# Patient Record
Sex: FEMALE | Race: WHITE | NOT HISPANIC OR LATINO | URBAN - METROPOLITAN AREA
[De-identification: names, ages, dates, MRNs, and addresses within clinical notes are randomized per-mention and may not be internally consistent; named-entity substitution may affect disease eponyms.]

---

## 2021-01-12 ENCOUNTER — OFFICE (OUTPATIENT)
Dept: URBAN - METROPOLITAN AREA CLINIC 106 | Facility: CLINIC | Age: 81
End: 2021-01-12

## 2021-01-12 VITALS
SYSTOLIC BLOOD PRESSURE: 110 MMHG | TEMPERATURE: 97.2 F | WEIGHT: 147 LBS | HEART RATE: 88 BPM | HEIGHT: 62 IN | DIASTOLIC BLOOD PRESSURE: 58 MMHG

## 2021-01-12 DIAGNOSIS — Z88.2 ALLERGY STATUS TO SULFONAMIDES: ICD-10-CM

## 2021-01-12 DIAGNOSIS — K51.019 ULCERATIVE (CHRONIC) PANCOLITIS WITH UNSPECIFIED COMPLICATIO: ICD-10-CM

## 2021-01-12 DIAGNOSIS — E86.0 DEHYDRATION: ICD-10-CM

## 2021-01-12 PROCEDURE — 99214 OFFICE O/P EST MOD 30 MIN: CPT | Performed by: SPECIALIST

## 2021-01-12 NOTE — SERVICEHPINOTES
America Linares   is seen today for a follow-up visit.     Patient has history of UC pancolitis and allergy to sulfa.  She now has too numerous to count BM with blood and cramps with fecal incontinence.  Her supportive daughter accompanies her today.  She generally does not feel well.  Stools for c diff and calprotectin were ordered and sent but for some reason not received by [facility].  Pt taking bile binding agents without improvementPrevious diagnostic tests have included have included CT scan and colonoscopy (incomplete 12/13 surgery 3/13 Dr. 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 Quiroz did colon and dx with pan ulcerative colitis and started prednisone and mesalamine but she developed pruritic rash and stopped both medications.) Able to go back on Prednisone 2/19 TB cellular testing negative as was Hepatitis B testing She was seen 6/2019 by Dr. Mares and we discussed all treatment options.  Entyvio was going to cost $7000. She was approved for patient assistance for Uceris which she started 9/2019 and she was improved and stopped. She was referred to [facility] [facility] but they do not take her insurance. Surgical history: cholecystectomy (and partial sigmoid colon for stricture).BR.  Taking bile binding agents without relief or problenmBR

## 2021-01-13 LAB
CBC WITH DIFFERENTIAL/PLATELET: BASO (ABSOLUTE): 0 X10E3/UL (ref 0–0.2)
CBC WITH DIFFERENTIAL/PLATELET: BASOS: 0 %
CBC WITH DIFFERENTIAL/PLATELET: EOS (ABSOLUTE): 0.1 X10E3/UL (ref 0–0.4)
CBC WITH DIFFERENTIAL/PLATELET: EOS: 1 %
CBC WITH DIFFERENTIAL/PLATELET: HEMATOCRIT: 35 % (ref 34–46.6)
CBC WITH DIFFERENTIAL/PLATELET: HEMATOLOGY COMMENTS: (no result)
CBC WITH DIFFERENTIAL/PLATELET: HEMOGLOBIN: 11.1 G/DL (ref 11.1–15.9)
CBC WITH DIFFERENTIAL/PLATELET: IMMATURE CELLS: (no result)
CBC WITH DIFFERENTIAL/PLATELET: IMMATURE GRANS (ABS): (no result)
CBC WITH DIFFERENTIAL/PLATELET: IMMATURE GRANULOCYTES: (no result)
CBC WITH DIFFERENTIAL/PLATELET: LYMPHS (ABSOLUTE): 0.6 X10E3/UL — LOW (ref 0.7–3.1)
CBC WITH DIFFERENTIAL/PLATELET: LYMPHS: 7 %
CBC WITH DIFFERENTIAL/PLATELET: MCH: 25.9 PG — LOW (ref 26.6–33)
CBC WITH DIFFERENTIAL/PLATELET: MCHC: 31.7 G/DL (ref 31.5–35.7)
CBC WITH DIFFERENTIAL/PLATELET: MCV: 82 FL (ref 79–97)
CBC WITH DIFFERENTIAL/PLATELET: MONOCYTES(ABSOLUTE): 3.1 X10E3/UL — HIGH (ref 0.1–0.9)
CBC WITH DIFFERENTIAL/PLATELET: MONOCYTES: 34 %
CBC WITH DIFFERENTIAL/PLATELET: NEUTROPHILS (ABSOLUTE): 5.3 X10E3/UL (ref 1.4–7)
CBC WITH DIFFERENTIAL/PLATELET: NEUTROPHILS: 58 %
CBC WITH DIFFERENTIAL/PLATELET: NRBC: (no result)
CBC WITH DIFFERENTIAL/PLATELET: PLATELETS: 573 X10E3/UL — HIGH (ref 150–450)
CBC WITH DIFFERENTIAL/PLATELET: RBC: 4.28 X10E6/UL (ref 3.77–5.28)
CBC WITH DIFFERENTIAL/PLATELET: RDW: 12.3 % (ref 11.7–15.4)
CBC WITH DIFFERENTIAL/PLATELET: WBC: 9.2 X10E3/UL (ref 3.4–10.8)
COMP. METABOLIC PANEL (14): A/G RATIO: 1 — LOW (ref 1.2–2.2)
COMP. METABOLIC PANEL (14): ALBUMIN: 2.8 G/DL — LOW (ref 3.7–4.7)
COMP. METABOLIC PANEL (14): ALKALINE PHOSPHATASE: 69 IU/L (ref 39–117)
COMP. METABOLIC PANEL (14): ALT (SGPT): 8 IU/L (ref 0–32)
COMP. METABOLIC PANEL (14): AST (SGOT): 12 IU/L (ref 0–40)
COMP. METABOLIC PANEL (14): BILIRUBIN, TOTAL: 0.7 MG/DL (ref 0–1.2)
COMP. METABOLIC PANEL (14): BUN/CREATININE RATIO: 15 (ref 12–28)
COMP. METABOLIC PANEL (14): BUN: 22 MG/DL (ref 8–27)
COMP. METABOLIC PANEL (14): CALCIUM: 8.5 MG/DL — LOW (ref 8.7–10.3)
COMP. METABOLIC PANEL (14): CARBON DIOXIDE, TOTAL: 20 MMOL/L (ref 20–29)
COMP. METABOLIC PANEL (14): CHLORIDE: 94 MMOL/L — LOW (ref 96–106)
COMP. METABOLIC PANEL (14): CREATININE: 1.45 MG/DL — HIGH (ref 0.57–1)
COMP. METABOLIC PANEL (14): EGFR IF AFRICN AM: 40 ML/MIN/1.73 — LOW (ref 59–?)
COMP. METABOLIC PANEL (14): EGFR IF NONAFRICN AM: 35 ML/MIN/1.73 — LOW (ref 59–?)
COMP. METABOLIC PANEL (14): GLOBULIN, TOTAL: 2.7 G/DL (ref 1.5–4.5)
COMP. METABOLIC PANEL (14): GLUCOSE: 112 MG/DL — HIGH (ref 65–99)
COMP. METABOLIC PANEL (14): POTASSIUM: 3.4 MMOL/L — LOW (ref 3.5–5.2)
COMP. METABOLIC PANEL (14): PROTEIN, TOTAL: 5.5 G/DL — LOW (ref 6–8.5)
COMP. METABOLIC PANEL (14): SODIUM: 132 MMOL/L — LOW (ref 134–144)

## 2021-01-19 ENCOUNTER — INPATIENT HOSPITAL (OUTPATIENT)
Dept: URBAN - METROPOLITAN AREA MEDICAL CENTER 9 | Facility: MEDICAL CENTER | Age: 81
End: 2021-01-19
Payer: MEDICARE

## 2021-01-19 DIAGNOSIS — R19.7 DIARRHEA, UNSPECIFIED: ICD-10-CM

## 2021-01-19 DIAGNOSIS — D50.9 IRON DEFICIENCY ANEMIA, UNSPECIFIED: ICD-10-CM

## 2021-01-19 DIAGNOSIS — R93.5 ABNORMAL FINDINGS ON DIAGNOSTIC IMAGING OF OTHER ABDOMINAL R: ICD-10-CM

## 2021-01-19 DIAGNOSIS — K51.019 ULCERATIVE (CHRONIC) PANCOLITIS WITH UNSPECIFIED COMPLICATIO: ICD-10-CM

## 2021-01-19 PROCEDURE — 99232 SBSQ HOSP IP/OBS MODERATE 35: CPT | Performed by: INTERNAL MEDICINE

## 2021-01-20 ENCOUNTER — INPATIENT HOSPITAL (OUTPATIENT)
Dept: URBAN - METROPOLITAN AREA MEDICAL CENTER 9 | Facility: MEDICAL CENTER | Age: 81
End: 2021-01-20
Payer: MEDICARE

## 2021-01-20 DIAGNOSIS — K51.019 ULCERATIVE (CHRONIC) PANCOLITIS WITH UNSPECIFIED COMPLICATIO: ICD-10-CM

## 2021-01-20 PROCEDURE — 99232 SBSQ HOSP IP/OBS MODERATE 35: CPT | Performed by: SPECIALIST

## 2021-02-02 ENCOUNTER — OFFICE (OUTPATIENT)
Dept: URBAN - METROPOLITAN AREA CLINIC 106 | Facility: CLINIC | Age: 81
End: 2021-02-02
Payer: MEDICARE

## 2021-02-02 VITALS
SYSTOLIC BLOOD PRESSURE: 120 MMHG | WEIGHT: 136 LBS | HEART RATE: 82 BPM | DIASTOLIC BLOOD PRESSURE: 78 MMHG | HEIGHT: 62 IN | TEMPERATURE: 97.1 F

## 2021-02-02 DIAGNOSIS — E05.00 THYROTOXICOSIS WITH DIFFUSE GOITER WITHOUT THYROTOXIC CRISIS: ICD-10-CM

## 2021-02-02 DIAGNOSIS — B37.0 CANDIDAL STOMATITIS: ICD-10-CM

## 2021-02-02 DIAGNOSIS — K51.019 ULCERATIVE (CHRONIC) PANCOLITIS WITH UNSPECIFIED COMPLICATIO: ICD-10-CM

## 2021-02-02 PROCEDURE — 99214 OFFICE O/P EST MOD 30 MIN: CPT | Performed by: SPECIALIST

## 2021-02-02 RX ORDER — FLUCONAZOLE 100 MG/1
TABLET ORAL
Qty: 16 | Refills: 0 | Status: ACTIVE
Start: 2021-02-02

## 2021-02-02 NOTE — SERVICEHPINOTES
America Linares   is seen today for a follow-up visit after recent hosspitilization.     Patient has history of UC pancolitis and allergy to sulfa. She was having too numerous to count BM with blood and cramps with fecal incontinence. Her supportive daughter accompanies her today. She generally does not feel well. Stools for c diff and calprotectin were ordered and sent but for some reason not received by [facility]. Pt taking bile binding agents without improvementPrevious diagnostic tests have included have included CT scan and colonoscopy (incomplete 12/13 surgery 3/13 Dr. 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 Quiroz did colon and dx with pan ulcerative colitis and started prednisone and mesalamine but she developed pruritic rash and stopped both medications.) Able to go back on Prednisone 2/19 TB cellular testing negative as was Hepatitis B testing She was seen 6/2019 by Dr. Mares and we discussed all treatment options. Entyvio was going to cost $7000. She was approved for patient assistance for Uceris which she started 9/2019 and she was improved and stopped. She was referred to [place] but they do not take her insurance. Surgical history: cholecystectomy (and partial sigmoid colon for stricture).Last visit her daughter was with her.  Today her daughter inn law accompanies her.  Her bleeding has resolved and her diarrhea is less.  She has oral thrush and anorexia and weight lossScreening NIEVES was negative for lupus....she does have Grave's disease on no therapy and needs to f/u with her endocrinologist.  Less diarrhea....mainly nocturnal BM without bleeding since IV steroids and oral steroids.  C diff was negativeBR.BR

## 2021-02-02 NOTE — SERVICENOTES
Lengthy discussion with patient again today (daughter in past and daugther-in-law today) about immunosuppressants (6MP, six mercaptopurine, Imuran, methotrexate), biologics (Humira, Remicade, Simponi, Cimzia, Entyvio, and Stelara), and Xeljanz. Risks including infection, abnormal liver functions test, liver toxicity (including activation of hepatitis B), pancreatitis, bone marrow suppression (anemia, decreased WBC, decreased platelets), worsening of heart failure, TB (tuberculosis) activation, and rare lymphomas which can be fatal were all discussed, handouts were given, and need for close monitoring of labs was stressed. Potential risks (osteoporosis, avascular necrosis, weight gain, elevation of glucose among many) of steroids (prednisone and budesonide). First pass hepatic metabolism of Entocort was also discussed.
Handouts were either given to the patient today or in the past. The patient was encouraged to ask questions as well as encouraged to look at the [facility] website or call if further information is needed. It was also recommended that the patient consult with their pharmacist and PCP if any of the above medications are used. The patient offered no further questions.

Pt has negative hep B and TB status. Also counseled about need for skin exams and watch for signs of heart failure. Also encouraged to remain up to date with vaccines via PCP but avoid live vaccinations.

We also discussed total colectomy if toxic megacolon, dysplasia or if she fails medical therapy.

They appear to understand and want to proceed with REMICADE if does not respond soon.  Also want thyroid checked to see if  Grave's in contributing to her issues

## 2021-02-22 ENCOUNTER — OFFICE (OUTPATIENT)
Dept: URBAN - METROPOLITAN AREA CLINIC 106 | Facility: CLINIC | Age: 81
End: 2021-02-22
Payer: MEDICARE

## 2021-02-22 VITALS
HEIGHT: 62 IN | SYSTOLIC BLOOD PRESSURE: 118 MMHG | HEART RATE: 78 BPM | WEIGHT: 126 LBS | TEMPERATURE: 97 F | DIASTOLIC BLOOD PRESSURE: 69 MMHG | RESPIRATION RATE: 14 BRPM

## 2021-02-22 DIAGNOSIS — R19.7 DIARRHEA, UNSPECIFIED: ICD-10-CM

## 2021-02-22 DIAGNOSIS — R10.9 UNSPECIFIED ABDOMINAL PAIN: ICD-10-CM

## 2021-02-22 DIAGNOSIS — K51.019 ULCERATIVE (CHRONIC) PANCOLITIS WITH UNSPECIFIED COMPLICATIO: ICD-10-CM

## 2021-02-22 DIAGNOSIS — E05.00 THYROTOXICOSIS WITH DIFFUSE GOITER WITHOUT THYROTOXIC CRISIS: ICD-10-CM

## 2021-02-22 PROCEDURE — 99214 OFFICE O/P EST MOD 30 MIN: CPT

## 2021-03-29 ENCOUNTER — INPATIENT HOSPITAL (OUTPATIENT)
Dept: URBAN - METROPOLITAN AREA MEDICAL CENTER 9 | Facility: MEDICAL CENTER | Age: 81
End: 2021-03-29
Payer: MEDICARE

## 2021-03-29 DIAGNOSIS — R10.30 LOWER ABDOMINAL PAIN, UNSPECIFIED: ICD-10-CM

## 2021-03-29 DIAGNOSIS — D72.829 ELEVATED WHITE BLOOD CELL COUNT, UNSPECIFIED: ICD-10-CM

## 2021-03-29 DIAGNOSIS — K51.919 ULCERATIVE COLITIS, UNSPECIFIED WITH UNSPECIFIED COMPLICATIO: ICD-10-CM

## 2021-03-29 DIAGNOSIS — R19.7 DIARRHEA, UNSPECIFIED: ICD-10-CM

## 2021-03-29 PROCEDURE — 99232 SBSQ HOSP IP/OBS MODERATE 35: CPT | Performed by: INTERNAL MEDICINE

## 2021-03-31 ENCOUNTER — INPATIENT HOSPITAL (OUTPATIENT)
Dept: URBAN - METROPOLITAN AREA MEDICAL CENTER 9 | Facility: MEDICAL CENTER | Age: 81
End: 2021-03-31
Payer: MEDICARE

## 2021-03-31 VITALS — HEIGHT: 62 IN

## 2021-03-31 DIAGNOSIS — K51.90 ULCERATIVE COLITIS, UNSPECIFIED, WITHOUT COMPLICATIONS: ICD-10-CM

## 2021-03-31 PROCEDURE — 99232 SBSQ HOSP IP/OBS MODERATE 35: CPT | Performed by: SPECIALIST

## 2021-04-01 ENCOUNTER — INPATIENT HOSPITAL (OUTPATIENT)
Dept: URBAN - METROPOLITAN AREA MEDICAL CENTER 9 | Facility: MEDICAL CENTER | Age: 81
End: 2021-04-01
Payer: MEDICARE

## 2021-04-01 VITALS — HEIGHT: 62 IN

## 2021-04-01 DIAGNOSIS — K51.80 OTHER ULCERATIVE COLITIS WITHOUT COMPLICATIONS: ICD-10-CM

## 2021-04-01 DIAGNOSIS — K92.1 MELENA: ICD-10-CM

## 2021-04-01 PROCEDURE — 99232 SBSQ HOSP IP/OBS MODERATE 35: CPT | Performed by: SPECIALIST

## 2021-04-16 ENCOUNTER — OFFICE (OUTPATIENT)
Dept: URBAN - METROPOLITAN AREA CLINIC 106 | Facility: CLINIC | Age: 81
End: 2021-04-16
Payer: MEDICARE

## 2021-04-16 VITALS — HEIGHT: 62 IN

## 2021-04-16 DIAGNOSIS — K51.019 ULCERATIVE (CHRONIC) PANCOLITIS WITH UNSPECIFIED COMPLICATIO: ICD-10-CM

## 2021-04-16 DIAGNOSIS — Z23 ENCOUNTER FOR IMMUNIZATION: ICD-10-CM

## 2021-04-16 PROCEDURE — 90471 IMMUNIZATION ADMIN: CPT | Performed by: SPECIALIST

## 2021-11-16 ENCOUNTER — OFFICE (OUTPATIENT)
Dept: URBAN - METROPOLITAN AREA CLINIC 67 | Facility: CLINIC | Age: 81
End: 2021-11-16

## 2021-11-16 VITALS
WEIGHT: 144 LBS | HEART RATE: 81 BPM | OXYGEN SATURATION: 95 % | SYSTOLIC BLOOD PRESSURE: 124 MMHG | TEMPERATURE: 98.1 F | DIASTOLIC BLOOD PRESSURE: 80 MMHG | HEIGHT: 62 IN

## 2021-11-16 DIAGNOSIS — K51.00 ULCERATIVE (CHRONIC) PANCOLITIS WITHOUT COMPLICATIONS: ICD-10-CM

## 2021-11-16 DIAGNOSIS — Z88.2 ALLERGY STATUS TO SULFONAMIDES: ICD-10-CM

## 2021-11-16 PROCEDURE — 99214 OFFICE O/P EST MOD 30 MIN: CPT | Performed by: SPECIALIST

## 2021-11-16 NOTE — SERVICENOTES
Lengthy discussion with pt about immunosuppressants (6MP, six mercaptopurine, Imuran, methotrexate), biologics (Humira, Remicade, Simponi, Cimzia, Entyvio, and Stelara), and Xeljanz. Risks including infection, abnormal liver functions test, liver toxicity (including activation of hepatitis B), pancreatitis, bone marrow suppression (anemia, decreased WBC, decreased platelets), worsening of heart failure, TB (tuberculosis) activation, and rare lymphomas which can be fatal were all discussed, handouts were given, and need for close monitoring of labs was stressed. Potential risks (osteoporosis, avascular necrosis, weight gain, elevation of glucose among many) of steroids (prednisone and budesonide). First pass hepatic metabolism of Entocort was also discussed.
Handouts were either given to the patient today or in the past. The patient was encouraged to ask questions as well as encouraged to look at the [facility] website or call if further information is needed. It was also recommended that the patient consult with their pharmacist and PCP if any of the above medications are used. The patient offered no further questions.

Pt encouraged to discuss hep B and TB status with prescribing MD. Also counseled about need for skin exams and watch for signs of heart failure. Also encouraged to remain up to date with vaccines via PCP but avoid live vaccinations

## 2021-11-16 NOTE — SERVICEHPINOTES
America Linares   is seen today for a follow-up visit.     
br
brCurrent symptoms include: none at presentDate of diagnosis/Presenting symptoms: 2/19 Dr HoodLocation/extent of disease: pan colitisbrComplications/Extra intestinal:Other significant history:brSmoking status negTB/Hep B status: negbrimmunizations:brPrior medications/reactions/drug levels:  Rash to 5ASAbrCurrent medications: Humira q 2 weeksbrNutrition/supplements:Studiesbrlabs:brCBC brCMP bresr/crp/fecal [id number] brIron studies brvit D brotherimaging: CTpbrSurgeries: sigmoid resection for diverticulitis/Stricture Dr Stuart 3//13brEGD:brColonoscopy: 12/13 sigmoid stricturebrSmall bowel studies:br
br
br Iron infusion  HemphillMedical record review:I have reviewed the above studies/records and incorporated them into my medical decision making when applicable.br Constipation is described as the following:brReason for encounter: reoccurrence of symptoms. The frequency of bowel movements has been per day (2-3). The stools are hard stools. 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
br
br Ulcerative colitis is described as the following:brReason for encounter: for routine follow-up. The disease is described as being located in the entire colon. Surgical history: cholecystectomy (and partial sigmoid colon for stricture).Current medications include Lialda (developed rash and stopped has sulfa allergy) and Prednisone (in past).Current medications include mesalamine (rash) and Uceris.Previous evaluations have included CT scan, colonoscopy (2/2019- DR Quiroz- moderate to severe ulcerative pancolitis,path- severe colitis, no dysplasia), Hgb/Hct/WBC/MCV/platelet count (2/19- WV=BC 6, Hgb 9, Hct 30, plat 393,  6/2019- WBC 6, Hgb 12, Hct 39, plat 313), Basic metabolic panel (BMP) (12/4/19- Glucose 83, Creat 1.05, BUN 27, GFR 51) and liver function tests (T.B./alk.phos./AST/ALT) (2/19- normal).

## 2022-03-31 ENCOUNTER — RX ONLY (RX ONLY)
Age: 82
End: 2022-03-31

## 2022-03-31 ENCOUNTER — CONSULT (OUTPATIENT)
Dept: URBAN - METROPOLITAN AREA CLINIC 18 | Facility: CLINIC | Age: 82
Setting detail: DERMATOLOGY
End: 2022-03-31

## 2022-03-31 DIAGNOSIS — L57.0 ACTINIC KERATOSIS: ICD-10-CM

## 2022-03-31 PROBLEM — L85.3 XEROSIS CUTIS: Status: RESOLVED | Noted: 2022-03-31

## 2022-03-31 PROBLEM — L20.9 ATOPIC DERMATITIS, UNSPECIFIED: Status: RESOLVED | Noted: 2022-03-31

## 2022-03-31 PROCEDURE — 99204 OFFICE O/P NEW MOD 45 MIN: CPT

## 2022-03-31 PROCEDURE — 17110 DESTRUCT B9 LESION 1-14: CPT

## 2022-03-31 RX ORDER — TRIAMCINOLONE ACETONIDE 1 MG/G
CREAM TOPICAL
Qty: 454 | Refills: 3
Start: 2022-03-31

## 2022-06-14 ENCOUNTER — APPOINTMENT (OUTPATIENT)
Dept: URBAN - METROPOLITAN AREA SURGERY 11 | Age: 82
Setting detail: DERMATOLOGY
End: 2022-06-14

## 2022-06-14 DIAGNOSIS — L44.8 OTHER SPECIFIED PAPULOSQUAMOUS DISORDERS: ICD-10-CM

## 2022-06-14 DIAGNOSIS — L20.89 OTHER ATOPIC DERMATITIS: ICD-10-CM

## 2022-06-14 DIAGNOSIS — D69.2 OTHER NONTHROMBOCYTOPENIC PURPURA: ICD-10-CM

## 2022-06-14 PROCEDURE — OTHER PRESCRIPTION: OTHER

## 2022-06-14 PROCEDURE — OTHER COUNSELING: OTHER

## 2022-06-14 PROCEDURE — 99214 OFFICE O/P EST MOD 30 MIN: CPT

## 2022-06-14 PROCEDURE — OTHER REASSURANCE: OTHER

## 2022-06-14 RX ORDER — TACROLIMUS 1 MG/G
OINTMENT TOPICAL
Qty: 60 | Refills: 6 | Status: ERX | COMMUNITY
Start: 2022-06-14

## 2022-06-14 ASSESSMENT — LOCATION SIMPLE DESCRIPTION DERM
LOCATION SIMPLE: LEFT FOREARM
LOCATION SIMPLE: RIGHT FOREARM
LOCATION SIMPLE: RIGHT PRETIBIAL REGION
LOCATION SIMPLE: LEFT PRETIBIAL REGION
LOCATION SIMPLE: RIGHT CALF
LOCATION SIMPLE: LEFT CALF

## 2022-06-14 ASSESSMENT — LOCATION DETAILED DESCRIPTION DERM
LOCATION DETAILED: RIGHT PROXIMAL CALF
LOCATION DETAILED: RIGHT PROXIMAL DORSAL FOREARM
LOCATION DETAILED: RIGHT DISTAL DORSAL FOREARM
LOCATION DETAILED: RIGHT PROXIMAL PRETIBIAL REGION
LOCATION DETAILED: LEFT DISTAL DORSAL FOREARM
LOCATION DETAILED: LEFT PROXIMAL PRETIBIAL REGION
LOCATION DETAILED: LEFT PROXIMAL DORSAL FOREARM
LOCATION DETAILED: LEFT DISTAL CALF

## 2022-06-14 ASSESSMENT — LOCATION ZONE DERM
LOCATION ZONE: ARM
LOCATION ZONE: LEG

## 2022-06-14 NOTE — PROCEDURE: COUNSELING
Patient Specific Counseling (Will Not Stick From Patient To Patient): Recommend pt to use dermeleve. Samples and coupon given.
Detail Level: Zone

## 2022-06-14 NOTE — HPI: RASH (ATOPIC DERMATITIS)
How Severe Is Your Atopic Dermatitis?: moderate
Is This A New Presentation, Or A Follow-Up?: Follow Up Atopic Dermatitis
Additional History: Pt states triamcinolone not helping

## 2022-07-28 ENCOUNTER — OFFICE (OUTPATIENT)
Dept: URBAN - METROPOLITAN AREA CLINIC 67 | Facility: CLINIC | Age: 82
End: 2022-07-28

## 2022-07-28 VITALS
SYSTOLIC BLOOD PRESSURE: 125 MMHG | WEIGHT: 155 LBS | DIASTOLIC BLOOD PRESSURE: 80 MMHG | HEIGHT: 61 IN | HEART RATE: 70 BPM

## 2022-07-28 DIAGNOSIS — Z88.2 ALLERGY STATUS TO SULFONAMIDES: ICD-10-CM

## 2022-07-28 DIAGNOSIS — R19.7 DIARRHEA, UNSPECIFIED: ICD-10-CM

## 2022-07-28 DIAGNOSIS — K51.00 ULCERATIVE (CHRONIC) PANCOLITIS WITHOUT COMPLICATIONS: ICD-10-CM

## 2022-07-28 PROCEDURE — 99214 OFFICE O/P EST MOD 30 MIN: CPT | Performed by: SPECIALIST

## 2022-07-28 RX ORDER — LACTOBACIL 2/BIFIDO 1/S.THERMO 450B CELL
225 PACKET (EA) ORAL
Qty: 60 | Refills: 1 | Status: ACTIVE
Start: 2022-07-28

## 2022-08-19 ENCOUNTER — INPATIENT HOSPITAL (OUTPATIENT)
Dept: URBAN - METROPOLITAN AREA MEDICAL CENTER 11 | Facility: MEDICAL CENTER | Age: 82
End: 2022-08-19
Payer: MEDICARE

## 2022-08-19 DIAGNOSIS — A04.72 ENTEROCOLITIS DUE TO CLOSTRIDIUM DIFFICILE, NOT SPECIFIED AS: ICD-10-CM

## 2022-08-19 DIAGNOSIS — K62.5 HEMORRHAGE OF ANUS AND RECTUM: ICD-10-CM

## 2022-08-19 PROCEDURE — 99232 SBSQ HOSP IP/OBS MODERATE 35: CPT | Performed by: SPECIALIST

## 2022-08-20 ENCOUNTER — INPATIENT HOSPITAL (OUTPATIENT)
Dept: URBAN - METROPOLITAN AREA MEDICAL CENTER 11 | Facility: MEDICAL CENTER | Age: 82
End: 2022-08-20
Payer: MEDICARE

## 2022-08-20 DIAGNOSIS — A04.72 ENTEROCOLITIS DUE TO CLOSTRIDIUM DIFFICILE, NOT SPECIFIED AS: ICD-10-CM

## 2022-08-20 DIAGNOSIS — K62.5 HEMORRHAGE OF ANUS AND RECTUM: ICD-10-CM

## 2022-08-20 PROCEDURE — 99232 SBSQ HOSP IP/OBS MODERATE 35: CPT | Performed by: SPECIALIST

## 2022-08-21 ENCOUNTER — INPATIENT HOSPITAL (OUTPATIENT)
Dept: URBAN - METROPOLITAN AREA MEDICAL CENTER 11 | Facility: MEDICAL CENTER | Age: 82
End: 2022-08-21
Payer: MEDICARE

## 2022-08-21 DIAGNOSIS — A04.72 ENTEROCOLITIS DUE TO CLOSTRIDIUM DIFFICILE, NOT SPECIFIED AS: ICD-10-CM

## 2022-08-21 DIAGNOSIS — K62.5 HEMORRHAGE OF ANUS AND RECTUM: ICD-10-CM

## 2022-08-21 PROCEDURE — 99232 SBSQ HOSP IP/OBS MODERATE 35: CPT | Performed by: SPECIALIST

## 2022-08-22 ENCOUNTER — INPATIENT HOSPITAL (OUTPATIENT)
Dept: URBAN - METROPOLITAN AREA MEDICAL CENTER 11 | Facility: MEDICAL CENTER | Age: 82
End: 2022-08-22
Payer: MEDICARE

## 2022-08-22 DIAGNOSIS — Z88.2 ALLERGY STATUS TO SULFONAMIDES: ICD-10-CM

## 2022-08-22 DIAGNOSIS — K51.019 ULCERATIVE (CHRONIC) PANCOLITIS WITH UNSPECIFIED COMPLICATIO: ICD-10-CM

## 2022-08-22 DIAGNOSIS — K92.1 MELENA: ICD-10-CM

## 2022-08-22 DIAGNOSIS — A04.71 ENTEROCOLITIS DUE TO CLOSTRIDIUM DIFFICILE, RECURRENT: ICD-10-CM

## 2022-08-22 PROCEDURE — 99232 SBSQ HOSP IP/OBS MODERATE 35: CPT | Performed by: SPECIALIST

## 2022-10-20 ENCOUNTER — OFFICE (OUTPATIENT)
Dept: URBAN - METROPOLITAN AREA CLINIC 67 | Facility: CLINIC | Age: 82
End: 2022-10-20

## 2022-10-20 VITALS
SYSTOLIC BLOOD PRESSURE: 120 MMHG | WEIGHT: 137 LBS | HEIGHT: 61 IN | DIASTOLIC BLOOD PRESSURE: 70 MMHG | HEART RATE: 82 BPM

## 2022-10-20 DIAGNOSIS — K51.018 ULCERATIVE (CHRONIC) PANCOLITIS WITH OTHER COMPLICATION: ICD-10-CM

## 2022-10-20 DIAGNOSIS — A49.8 OTHER BACTERIAL INFECTIONS OF UNSPECIFIED SITE: ICD-10-CM

## 2022-10-20 PROCEDURE — 99215 OFFICE O/P EST HI 40 MIN: CPT | Performed by: SPECIALIST

## 2022-10-20 NOTE — SERVICENOTES
lengthy discussion with pt and daughter talked about tapering Dificid versus Vanco, fecal transplant and Zinplava.  Also discussed total colectomy if UC is her issues.   Will Continue Dificid with taper and consult Dr Lang.  Hold next dose Humira x 1 week

## 2022-10-20 NOTE — SERVICEHPINOTES
America Linares   is seen today for a follow-up visit.     Pt with relapsing c diff despite Vanco/Flagyl.  did better on Dificid.  Humira was controlling UC  prior to this relapse of c diffRecent watery diarrhea without bleeding.  son and daughter in law were sick with virus prior to issue.  Had been doing well with 1 formed BM per day.   Rash of unclear etiology [facility]Date of diagnosis/Presenting symptoms: 2/19 Dr HoodLocation/extent of disease: pan colitisbrComplications/Extra intestinal:Other significant history:brSmoking status negTB/Hep B status: negbrimmunizations:brPrior medications/reactions/drug levels:  Rash to 5ASAbrCurrent medications: Humira q 2 weeksbrNutrition/supplements:Studiesbrlabs:brCBCbrCMPbresr/crp/fecal [id number] studiesbrvit Dbrotherimaging: CTpbrSurgeries: sigmoid resection for diverticulitis/Stricture Dr Stuart 3//13brEGD:brColonoscopy: 12/13 sigmoid stricturebrSmall bowel studies:brIron infusion  HemphillMedical record review:I have reviewed the above studies/records and incorporated them into my medical decision making when applicable.brConstipation is described as the following:brReason for encounter: reoccurrence of symptoms. The frequency of bowel movements has been per day (2-3). The stools are hard stools. 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 The disease is described as being located in the entire colon. Surgical history: cholecystectomy (and partial sigmoid colon for stricture).Current medications include Lialda (developed rash and stopped has sulfa allergy) and Prednisone (in past).Current medications include mesalamine (rash) and Uceris.Previous evaluations have included CT scan, colonoscopy (2/2019- DR Quiroz- moderate to severe ulcerative pancolitis,path- severe colitis, no dysplasia), Hgb/Hct/WBC/MCV/platelet count (2/19- WV=BC 6, Hgb 9, Hct 30, plat 393,  6/2019- WBC 6, Hgb 12, Hct 39, plat 313), Basic metabolic panel (BMP) (12/4/19- Glucose 83, Creat 1.05, BUN 27, GFR 51) and liver function tests (T.B./alk.phos./AST/ALT) (2/19- normal). America Linares is seen today for a follow-up visit.

## 2023-08-09 ENCOUNTER — OFFICE (OUTPATIENT)
Dept: URBAN - METROPOLITAN AREA CLINIC 67 | Facility: CLINIC | Age: 83
End: 2023-08-09

## 2023-08-09 VITALS — HEIGHT: 61 IN | SYSTOLIC BLOOD PRESSURE: 110 MMHG | DIASTOLIC BLOOD PRESSURE: 52 MMHG | WEIGHT: 132 LBS

## 2023-08-09 DIAGNOSIS — K51.018 ULCERATIVE (CHRONIC) PANCOLITIS WITH OTHER COMPLICATION: ICD-10-CM

## 2023-08-09 DIAGNOSIS — Z86.19 PERSONAL HISTORY OF OTHER INFECTIOUS AND PARASITIC DISEASES: ICD-10-CM

## 2023-08-09 DIAGNOSIS — Z90.49 ACQUIRED ABSENCE OF OTHER SPECIFIED PARTS OF DIGESTIVE TRACT: ICD-10-CM

## 2023-08-09 PROCEDURE — 99214 OFFICE O/P EST MOD 30 MIN: CPT | Performed by: SPECIALIST

## 2023-08-09 RX ORDER — COLESTIPOL HYDROCHLORIDE 1 G/1
1 TABLET, FILM COATED ORAL
Qty: 90 | Refills: 3 | Status: ACTIVE
Start: 2023-08-09

## 2025-02-18 ENCOUNTER — OFFICE (OUTPATIENT)
Dept: URBAN - METROPOLITAN AREA CLINIC 67 | Facility: CLINIC | Age: 85
End: 2025-02-18

## 2025-02-18 VITALS
DIASTOLIC BLOOD PRESSURE: 84 MMHG | SYSTOLIC BLOOD PRESSURE: 120 MMHG | OXYGEN SATURATION: 99 % | HEART RATE: 68 BPM | HEIGHT: 62 IN | WEIGHT: 152 LBS

## 2025-02-18 DIAGNOSIS — K51.00 ULCERATIVE (CHRONIC) PANCOLITIS WITHOUT COMPLICATIONS: ICD-10-CM

## 2025-02-18 DIAGNOSIS — E05.00 THYROTOXICOSIS WITH DIFFUSE GOITER WITHOUT THYROTOXIC CRISIS: ICD-10-CM

## 2025-02-18 DIAGNOSIS — Z88.2 ALLERGY STATUS TO SULFONAMIDES: ICD-10-CM

## 2025-02-18 PROCEDURE — 99214 OFFICE O/P EST MOD 30 MIN: CPT | Performed by: SPECIALIST
